# Patient Record
Sex: MALE | URBAN - METROPOLITAN AREA
[De-identification: names, ages, dates, MRNs, and addresses within clinical notes are randomized per-mention and may not be internally consistent; named-entity substitution may affect disease eponyms.]

---

## 2021-08-03 ENCOUNTER — HOSPITAL ENCOUNTER (OUTPATIENT)
Age: 36
Setting detail: SPECIMEN
Discharge: HOME | End: 2021-08-03
Payer: MEDICAID

## 2021-08-03 LAB
CHOLESTEROL, FASTING: 179 MG/DL
CHOLESTEROL/HDL RATIO: 3.3
HDLC SERPL-MCNC: 55 MG/DL
LDL CHOLESTEROL: 110 MG/DL (ref 0–130)
TRIGLYCERIDE, FASTING: 68 MG/DL
VLDLC SERPL CALC-MCNC: NORMAL MG/DL (ref 1–30)

## 2022-06-29 ENCOUNTER — TELEPHONE (OUTPATIENT)
Dept: DERMATOLOGY | Age: 37
End: 2022-06-29

## 2022-06-29 NOTE — TELEPHONE ENCOUNTER
Received referral for patient, unable to reach him- phone goes straight to VM and no VM set up.  Sent referral back to referring provider to let them know

## 2022-06-30 ENCOUNTER — OFFICE VISIT (OUTPATIENT)
Dept: DERMATOLOGY | Age: 37
End: 2022-06-30
Payer: MEDICAID

## 2022-06-30 VITALS
BODY MASS INDEX: 24.52 KG/M2 | HEIGHT: 72 IN | WEIGHT: 181 LBS | DIASTOLIC BLOOD PRESSURE: 64 MMHG | SYSTOLIC BLOOD PRESSURE: 101 MMHG | OXYGEN SATURATION: 97 % | HEART RATE: 61 BPM | TEMPERATURE: 97.8 F

## 2022-06-30 DIAGNOSIS — L28.1 PRURIGO NODULARIS: Primary | ICD-10-CM

## 2022-06-30 DIAGNOSIS — L73.9 FOLLICULITIS: ICD-10-CM

## 2022-06-30 DIAGNOSIS — A63.0 CONDYLOMA ACUMINATA: ICD-10-CM

## 2022-06-30 PROCEDURE — 4004F PT TOBACCO SCREEN RCVD TLK: CPT | Performed by: PHYSICIAN ASSISTANT

## 2022-06-30 PROCEDURE — 54056 CRYOSURGERY PENIS LESION(S): CPT | Performed by: PHYSICIAN ASSISTANT

## 2022-06-30 PROCEDURE — 99204 OFFICE O/P NEW MOD 45 MIN: CPT | Performed by: PHYSICIAN ASSISTANT

## 2022-06-30 PROCEDURE — G8427 DOCREV CUR MEDS BY ELIG CLIN: HCPCS | Performed by: PHYSICIAN ASSISTANT

## 2022-06-30 PROCEDURE — G8420 CALC BMI NORM PARAMETERS: HCPCS | Performed by: PHYSICIAN ASSISTANT

## 2022-06-30 RX ORDER — VENLAFAXINE HYDROCHLORIDE 75 MG/1
150 CAPSULE, EXTENDED RELEASE ORAL DAILY
COMMUNITY
Start: 2021-11-01

## 2022-06-30 RX ORDER — CLINDAMYCIN PHOSPHATE 10 MG/G
GEL TOPICAL
COMMUNITY
Start: 2022-06-23 | End: 2022-06-30

## 2022-06-30 RX ORDER — CLINDAMYCIN PHOSPHATE 10 UG/ML
LOTION TOPICAL
Qty: 60 ML | Refills: 3 | Status: SHIPPED | OUTPATIENT
Start: 2022-06-30

## 2022-06-30 NOTE — PROGRESS NOTES
Dermatology Patient Note  [address]  [place] [id number]  Dept: [phone]  Dept Fax: [phone]      VISITDATE: 6/30/2022   REFERRING PROVIDER: No ref. provider found      Roberta Jacques is a 31 y.o. male  who presents today in the office for:    New Patient (lesion on his upper back, one on his right cheek, and a lesion on his lower lip xmonths. States that he is not sure what it is, and is not sure that his PCP prescribed him the correct things. States that they started out as pimples and is still present, states he did pop it and it did grow in size afterwards. One of cheek is smaller than the other two that becomes rock hard. )      HISTORY OF PRESENT ILLNESS:  As above. Admits to habitual excoriation. Lesions begin as tender  Pustules. Also c/o \"bumps\" on pubis. MEDICAL PROBLEMS:  There are no problems to display for this patient. CURRENT MEDICATIONS:   Current Outpatient Medications   Medication Sig Dispense Refill    venlafaxine (EFFEXOR XR) 75 MG extended release capsule Take 150 mg by mouth daily      benzoyl peroxide 5 % external liquid Use to wash beard area daily. 227 g 3    clindamycin (CLEOCIN T) 1 % lotion Apply to beard area daily. 60 mL 3     No current facility-administered medications for this visit. ALLERGIES:   No Known Allergies    SOCIAL HISTORY:  Social History     Tobacco Use    Smoking status: Current Every Day Smoker     Types: Cigarettes    Smokeless tobacco: Never Used   Substance Use Topics    Alcohol use: Not Currently       Pertinent ROS:  Review of Systems  Skin: Denies any new changing, growing or bleeding lesions or rashes except as described in the HPI   Constitutional: Denies fevers, chills, and malaise.     PHYSICAL EXAM:   /64   Pulse 61   Temp 97.8 °F (36.6 °C)   Ht 6' (1.829 m)   Wt 181 lb (82.1 kg)   SpO2 97%   BMI 24.55 kg/m²     The patient is generally well appearing, well nourished, alert and conversational. Affect is normal.    Cutaneous Exam:  Physical Exam  Focused exam of face, back, and pubis was performed    Facial covering was removed during examination. Diagnoses/exam findings/medical history pertinent to this visit are listed below:    Assessment:   Diagnosis Orders   1. Prurigo nodularis     2. Folliculitis  benzoyl peroxide 5 % external liquid    clindamycin (CLEOCIN T) 1 % lotion   3. Condyloma acuminata  UT DESTR PENIS CECILIAN,RIN,CRYOSURG        Plan:  1. Prurigo nodularis  - Behavioral modification, including covering with a band-aid and Vaseline until healed    2. Folliculitis  - chronic illness with progression and/or exacerbation  - benzoyl peroxide 5 % external liquid; Use to wash beard area daily. Dispense: 227 g; Refill: 3  - clindamycin (CLEOCIN T) 1 % lotion; Apply to beard area daily. Dispense: 60 mL; Refill: 3    3. Condyloma acuminata  After discussion of the risks, benefits, and alternative therapies available, the patient elected to proceed. After obtaining written informed consent, the patient's identity, procedure, and site were verified during a time out prior to proceeding procedure. The 2 lesions on the pubis and base of the penis were treated using liquid nitrogen spray gun for 6 second per cycle, 2 cycles total. The patient tolerated the procedure well and there were no immediate complications.  - UT DESTR PENIS RIN BURGESS,CRYOSURG      RTC 1Y    Future Appointments   Date Time Provider Jose Nguyen   6/30/2023  1:00 PM Juan Ramon Ramírez PA-C  [facility]         There are no Patient Instructions on file for this visit.       Electronically signed by Juan Ramon Ramírez PA-C on 6/30/22 at 6:44 PM EDT

## 2023-06-30 ENCOUNTER — HOSPITAL ENCOUNTER (OUTPATIENT)
Age: 38
End: 2023-06-30
Payer: MEDICAID

## 2023-06-30 ENCOUNTER — HOSPITAL ENCOUNTER (OUTPATIENT)
Dept: GENERAL RADIOLOGY | Age: 38
End: 2023-06-30
Payer: MEDICAID

## 2023-06-30 DIAGNOSIS — M54.16 LUMBAR RADICULOPATHY: ICD-10-CM

## 2023-06-30 PROCEDURE — 72100 X-RAY EXAM L-S SPINE 2/3 VWS: CPT

## 2023-07-10 ENCOUNTER — HOSPITAL ENCOUNTER (OUTPATIENT)
Dept: MRI IMAGING | Age: 38
Discharge: HOME | End: 2023-07-12
Payer: MEDICAID

## 2023-07-10 DIAGNOSIS — M54.42 CHRONIC LEFT-SIDED LOW BACK PAIN WITH LEFT-SIDED SCIATICA: ICD-10-CM

## 2023-07-10 DIAGNOSIS — G89.29 CHRONIC LEFT-SIDED LOW BACK PAIN WITH LEFT-SIDED SCIATICA: ICD-10-CM

## 2023-07-10 PROCEDURE — 72148 MRI LUMBAR SPINE W/O DYE: CPT
